# Patient Record
Sex: FEMALE | Employment: OTHER | URBAN - METROPOLITAN AREA
[De-identification: names, ages, dates, MRNs, and addresses within clinical notes are randomized per-mention and may not be internally consistent; named-entity substitution may affect disease eponyms.]

---

## 2018-02-14 PROBLEM — R73.01 IMPAIRED FASTING GLUCOSE: Status: ACTIVE | Noted: 2018-02-14

## 2018-07-10 PROBLEM — E11.9 CONTROLLED TYPE 2 DIABETES MELLITUS WITHOUT COMPLICATION, WITHOUT LONG-TERM CURRENT USE OF INSULIN (HCC): Status: ACTIVE | Noted: 2018-07-10

## 2018-07-20 ENCOUNTER — OFFICE VISIT (OUTPATIENT)
Dept: CARDIOLOGY | Facility: MEDICAL CENTER | Age: 78
End: 2018-07-20
Payer: MEDICARE

## 2018-07-20 VITALS
BODY MASS INDEX: 29.06 KG/M2 | OXYGEN SATURATION: 96 % | HEART RATE: 70 BPM | DIASTOLIC BLOOD PRESSURE: 84 MMHG | SYSTOLIC BLOOD PRESSURE: 138 MMHG | WEIGHT: 164 LBS | RESPIRATION RATE: 14 BRPM | HEIGHT: 63 IN

## 2018-07-20 DIAGNOSIS — I48.0 PAROXYSMAL ATRIAL FIBRILLATION (HCC): ICD-10-CM

## 2018-07-20 DIAGNOSIS — I10 ESSENTIAL HYPERTENSION, BENIGN: ICD-10-CM

## 2018-07-20 DIAGNOSIS — E78.2 MIXED DYSLIPIDEMIA: ICD-10-CM

## 2018-07-20 PROCEDURE — 93000 ELECTROCARDIOGRAM COMPLETE: CPT | Performed by: INTERNAL MEDICINE

## 2018-07-20 PROCEDURE — 99205 OFFICE O/P NEW HI 60 MIN: CPT | Performed by: INTERNAL MEDICINE

## 2018-07-20 NOTE — PROGRESS NOTES
Chief Complaint   Patient presents with   • Atrial Fibrillation       Subjective:   Rosa Bone is a 78 y.o. female who presents today for evaluation of atrial fibrillation.  Considers herself to be very healthy but noticed intermittent heartburn in February which resolved on its own.  She then began to feel exertional breathlessness about a month ago.  This began to bother her with her dance classes which she does 4 times a week as well as when she played pickle ball.  She would have to stop and catch her breath and seem not to have is good his stamina is usual.  Finally she went to see Dr. Chaney who noticed tachycardia and obtain an EKG which revealed atrial fibrillation with moderate to rapid ventricular response.  The patient was placed on diltiazem for rate control and Xarelto for stroke prevention.  She has not had any trouble with the medications but has not noticed much change in her symptoms.  She denies orthopnea, PND, pedal edema.  She did notice slight wheezing a couple of months ago but that subsided.  There is a remote history of smoking but she quit 40 years ago.  She enjoys a glass of wine a day.  There is a history of hypertension in the past but the patient has recently purchased a blood pressure device and has been checking her blood pressure occasionally.  She notices systolic pressures between 150 and 160 with diastolic pressures around 90 often.  She is interested in anticoagulation for stroke prevention but her initial prescription for Xarelto was $400.  The patient has recently developed glucose intolerance and she has some reduction in her renal function with a GFR of 43.  Hyperlipidemia has been a problem in the past as noted below.  However her recent cholesterol is 172, HDL 52,  on no medications just a dietary change.  There is been no stroke or TIA symptoms and no symptoms of claudication.    Past Medical History:   Diagnosis Date   • Allergy    • Anxiety    • Arthritis    •  "Depression    • History of chickenpox    • History of measles    • History of mumps    • Hypertension    • Incontinence of urine    • Insomnia    • Restless legs     With some neuropathic pain   • Shoulder fracture, right, closed, initial encounter Sept 2017 estimated    \"clean break\" no surgery; Dr Gillis     Past Surgical History:   Procedure Laterality Date   • ABDOMINAL HYSTERECTOMY TOTAL  1981    40yo; was on hormone therapy from 4725-6320.  Ovaries are still in.   • FOOT SURGERY       Family History   Problem Relation Age of Onset   • Heart Disease Mother 87   • Arthritis Mother    • Diabetes Mother    • Other Father 96   • Hypertension Brother    • Other Brother      gout   • Heart Failure Paternal Grandfather    • Cancer Other      grandfather   • Stroke Other      grandmother     Social History     Social History   • Marital status: Unknown     Spouse name: N/A   • Number of children: N/A   • Years of education: N/A     Occupational History   • Not on file.     Social History Main Topics   • Smoking status: Former Smoker     Packs/day: 1.00     Years: 15.00     Types: Cigarettes     Quit date: 1/1/1970   • Smokeless tobacco: Never Used   • Alcohol use Yes      Comment: daily wine 1 - 2    • Drug use: No   • Sexual activity: Not Currently     Partners: Male      Comment: ; brother is Brett Franco     Other Topics Concern   • Not on file     Social History Narrative   • No narrative on file     Allergies   Allergen Reactions   • Seasonal      Seasonal Allergies.   • Sulfa Drugs Rash     Rash     Outpatient Encounter Prescriptions as of 7/20/2018   Medication Sig Dispense Refill   • DILTIAZem (CARDIZEM) 30 MG Tab TAKE 1 TABLET BY MOUTH TWICE DAILY 180 Tab 1   • rivaroxaban (XARELTO) 20 MG Tab tablet Take 20 mg by mouth with dinner.     • metFORMIN (GLUCOPHAGE) 500 MG Tab Take 1 Tab by mouth every day. With breakfast 90 Tab 1   • zolpidem (AMBIEN) 5 MG Tab Take 1 Tab by mouth at bedtime as needed for " "Sleep for up to 180 days. 90 Tab 1   • busPIRone (BUSPAR) 5 MG Tab TAKE 1 TABLET BY MOUTH EVERY DAY AND 2 TABLETS BY MOUTH EVERY EVENING (Patient taking differently: TAKE 1 TABLET BY MOUTH EVERY DAY AND 1-2 TABLETS BY MOUTH EVERY EVENING) 180 Tab 0   • gabapentin (NEURONTIN) 400 MG Cap Take 1 Cap by mouth every day. 90 Cap 1   • citalopram (CELEXA) 40 MG Tab Take 1 Tab by mouth every day. 90 Tab 1   • Cholecalciferol (VITAMIN D3) 2000 UNIT Cap Take  by mouth every day.     • ZYRTEC ALLERGY 10 MG Tab Take 10 mg by mouth every morning.     • Multiple Vitamins-Minerals (SENIOR MULTIVITAMIN PLUS PO) Take  by mouth every day.     • diphenhydrAMINE (BENADRYL) 25 MG Tab Take 25 mg by mouth every bedtime.     • apixaban (ELIQUIS) 5mg Tab Take 1 Tab by mouth 2 Times a Day. (Patient not taking: Reported on 7/20/2018) 60 Tab 1   • trazodone (DESYREL) 50 MG Tab Take 1-2 tabs every night at bedtime. (Patient not taking: Reported on 7/20/2018) 90 Tab 0   • acetaminophen-codeine #3 (TYLENOL #3) 300-30 MG Tab Take 1-2 Tabs by mouth every 6 hours as needed for Moderate Pain. (Patient not taking: Reported on 7/20/2018) 10 Tab 0   • hydrochlorothiazide (HYDRODIURIL) 25 MG Tab Take 0.5 Tabs by mouth every day. (Patient not taking: Reported on 7/20/2018) 45 Tab 1   • Turmeric 450 MG Cap Take  by mouth every day.       No facility-administered encounter medications on file as of 7/20/2018.      ROS.  The patient complains of fatigability intermittently excessive sweating when she exercises, and occasional cough, rare wheezing, heartburn as noted in the HPI.  She has long history of depression but is not suicidal.  She has insomnia for which she takes Ambien most nights.  She complains of seasonal allergies and some neck pain which she attributes to arthritis.  All other organ system review responses are negative.     Objective:   Ht 1.6 m (5' 3\")   Wt 74.4 kg (164 lb)   BMI 29.05 kg/m²     Physical Exam   Exam:    Vitals:    07/20/18 " "1009   BP: 138/84   Pulse: 70   Resp: 14   SpO2: 96%   Weight: 74.4 kg (164 lb)   Height: 1.6 m (5' 3\")     Constitutional: Well developed, well nourished female in no acute distress. Appears younger than stated age and provides a good history.   HEENT: Normocephalic, pupils are equal and responsive. bilateral arcus. Conjunctiva and sclera are clear. No xanthelasma. No diagonal ear lobe crease noted. Mucus membranes are moist and pink. Good oral hygiene  Neck: No JVD or HJR. JVP = 4-5cm H2O. Good carotid upstroke with no bruit. No lymphadenopathy, No thyroid enlargement.  Cardiovascular: Regular rhythm, no ectopics. No murmur, gallop, rub or click. No lift, heave,  thrill, or cardiomegaly  Lungs: Normal effort, Clear to auscultation and percussion. No rales, rhonchi, wheezing   Abdomen: Soft, non tender. No liver, kidney, spleen or mass palpable. The abdominal aorta is not palpable. Active bowel sounds are noted and there is no bruit  Extremities:  No cyanosis, edema, clubbing. 1+ posterior tibial and dorsal pedal pulses bilaterally.   Skin:   Warm and dry, no active lesions  Neurologic: Alert & oriented. Strength and sensation grossly intact. No lateralizing signs  Psychiatric:  Affect, mood, and judgement are appropriate      Conversion Encounter on 2/17/2016  [facility]\")' href=\"[email or web address]">2/17/2016   Preventive Medicine, Conversion on 7/17/2015  [facility]\")' href=\"[email or web address]/\">7/29/2015   Conversion Encounter on 3/17/2014  [facility]\")' href=\"[email or web address]/\">3/17/2014     207 (H) 232 (H) View Detailed Result Report  Lipid Panel w Ratios  3/17/2014\")' href=\"[id number] Panel w Ratios/\">219 (H)   227 (H) 168 (H) View Detailed Result Report  Lipid Panel " "w Ratios  3/17/2014\")' href=\"[email or web address] Panel w Ratios/\">141   View Detailed Result Report  Lipid Panel w Ratios  2/17/2016\")' href=\"[email or web address] Panel w Ratios/\">118 View Detailed Result Report  Lipid panel  7/29/2015\")' href=\"[id number] panel/\">139 (H) View Detailed Result Report  Lipid Panel w Ratios  3/17/2014\")' href=\"[id number] Panel w Ratios/\">136 (H)   44 (L) 59 View Detailed Result Report  Lipid Panel w Ratios  3/17/2014\")' href=\"[id number] Panel w Ratios/\">55   View Detailed Result Report  Lipid Panel w Ratios  2/17/2016\")' href=\"[id number] Panel w Ratios/\">2.7   View Detailed Result Report  Lipid Panel w Ratios  3/17/2014\")' href=\"[email or web address] Panel w Ratios/\">2.47   4.7 3.9         EKG reveals normal sinus rhythm with left atrial enlargement which corroborates a dilated left atrium by echocardiography.  She has late transition and poor R-wave progression.    Assessment:     1. Persistent atrial fibrillation (HCC)     2. Mixed dyslipidemia     3. Essential hypertension, benign         Medical Decision Making:  Today's Assessment / Status / Plan:   Many of the symptoms noted in the HPI or probably related to paroxysmal atrial fibrillation.  She is currently in normal sinus rhythm.  Her arrhythmia is likely a result of her left atrial enlargement and hypertension.  She also possibly has diastolic dysfunction.  She has several risk factors for stroke suggesting that chronic anticoagulation is indicated.  She is on Xarelto 20 mg a day which will be continued.  We discussed the financial " advantage of Coumadin anticoagulation versus the new agents.  The patient will contact [facility] to determine if she can get a better price on Xarelto.  She will let us know the results of this interaction.  From a physiologic standpoint, beta-blocker may be more effective for rate control than diltiazem.  I suggested trying metoprolol 25 mg twice daily and discontinuing diltiazem for the time being.  We discussed suppressive therapy but will try to establish how often and how long she is in atrial fibrillation versus sinus rhythm.  The patient will check her blood pressure and pulse with her new device daily.  I suggested that she could return in a month to assess her progress and she has an appointment with Dr. Chaney in about 2 weeks.  There is a history of hyperlipidemia but with dietary changes she has ideal levels at this point.  She will continue with this regimen.  Return in 1 month.

## 2018-07-22 LAB — EKG IMPRESSION: NORMAL

## 2018-07-31 PROBLEM — I48.0 PAROXYSMAL ATRIAL FIBRILLATION (HCC): Status: ACTIVE | Noted: 2018-07-31

## 2018-08-27 ENCOUNTER — OFFICE VISIT (OUTPATIENT)
Dept: CARDIOLOGY | Facility: MEDICAL CENTER | Age: 78
End: 2018-08-27
Payer: MEDICARE

## 2018-08-27 VITALS
SYSTOLIC BLOOD PRESSURE: 152 MMHG | RESPIRATION RATE: 14 BRPM | HEIGHT: 63 IN | HEART RATE: 62 BPM | BODY MASS INDEX: 29.23 KG/M2 | WEIGHT: 165 LBS | DIASTOLIC BLOOD PRESSURE: 88 MMHG | OXYGEN SATURATION: 96 %

## 2018-08-27 DIAGNOSIS — E11.9 CONTROLLED TYPE 2 DIABETES MELLITUS WITHOUT COMPLICATION, WITHOUT LONG-TERM CURRENT USE OF INSULIN (HCC): ICD-10-CM

## 2018-08-27 DIAGNOSIS — I48.0 PAROXYSMAL ATRIAL FIBRILLATION (HCC): ICD-10-CM

## 2018-08-27 PROCEDURE — 99213 OFFICE O/P EST LOW 20 MIN: CPT | Performed by: INTERNAL MEDICINE

## 2018-08-27 RX ORDER — LOSARTAN POTASSIUM 50 MG/1
50 TABLET ORAL DAILY
Qty: 30 TAB | Refills: 3 | Status: SHIPPED | OUTPATIENT
Start: 2018-08-27 | End: 2018-09-08

## 2018-08-27 NOTE — PROGRESS NOTES
"Chief Complaint   Patient presents with   • Follow-Up       Subjective:   Rosa Bone is a 78 y.o. female who presents today in follow-up fibrillation.  The patient is well with no complaints of palpitations.  Her energy level is good and she is not short of breath.  There is no orthopnea, PND, pedal edema.  She has no angina or chest pain or pressure.  She was received quite a discount on the price of Xarelto by using a [employer] company.  Her blood sugars have been on the high side as has her blood pressure.  Home blood pressures over the last few weeks generally range between 150 and 160 systolic and occasionally higher or lower.  She has no symptoms when her blood pressure is elevated.  She does not think her machine is registering irregular beats.    Past Medical History:   Diagnosis Date   • Allergy    • Anxiety    • Arthritis    • Depression    • History of chickenpox    • History of measles    • History of mumps    • Hypertension    • Incontinence of urine    • Insomnia    • Restless legs     With some neuropathic pain   • Shoulder fracture, right, closed, initial encounter Sept 2017 estimated    \"clean break\" no surgery; Dr Gillis     Past Surgical History:   Procedure Laterality Date   • ABDOMINAL HYSTERECTOMY TOTAL  1981    40yo; was on hormone therapy from 0754-2353.  Ovaries are still in.   • FOOT SURGERY       Family History   Problem Relation Age of Onset   • Heart Disease Mother 87   • Arthritis Mother    • Diabetes Mother    • Other Father 96   • Hypertension Brother    • Other Brother         gout   • Heart Failure Paternal Grandfather    • Cancer Other         grandfather   • Stroke Other         grandmother     Social History     Social History   • Marital status: Unknown     Spouse name: N/A   • Number of children: N/A   • Years of education: N/A     Occupational History   • Not on file.     Social History Main Topics   • Smoking status: Former Smoker     Packs/day: 1.00     Years: 15.00 "     Types: Cigarettes     Quit date: 1/1/1970   • Smokeless tobacco: Never Used   • Alcohol use Yes      Comment: daily wine 1 - 2    • Drug use: No   • Sexual activity: Not Currently     Partners: Male      Comment: ; brother is Brett Franco     Other Topics Concern   • Not on file     Social History Narrative   • No narrative on file     Allergies   Allergen Reactions   • Seasonal      Seasonal Allergies.   • Sulfa Drugs Rash     Rash     Outpatient Encounter Prescriptions as of 8/27/2018   Medication Sig Dispense Refill   • losartan (COZAAR) 50 MG Tab Take 1 Tab by mouth every day. 30 Tab 3   • citalopram (CELEXA) 40 MG Tab TAKE 1 TABLET BY MOUTH  EVERY DAY 90 Tab 1   • metFORMIN (GLUCOPHAGE) 500 MG Tab Take 1 Tab by mouth 2 times a day, with meals. With breakfast 180 Tab 1   • rivaroxaban (XARELTO) 20 MG Tab tablet Take 1 Tab by mouth with dinner. 90 Tab 1   • metoprolol (LOPRESSOR) 25 MG Tab TAKE 1 TABLET BY MOUTH TWICE DAILY 180 Tab 0   • zolpidem (AMBIEN) 5 MG Tab Take 1 Tab by mouth at bedtime as needed for Sleep for up to 180 days. 90 Tab 1   • busPIRone (BUSPAR) 5 MG Tab TAKE 1 TABLET BY MOUTH EVERY DAY AND 2 TABLETS BY MOUTH EVERY EVENING (Patient taking differently: TAKE ONE TABLET BY MOUTH TWICE A DAY.) 180 Tab 0   • gabapentin (NEURONTIN) 400 MG Cap Take 1 Cap by mouth every day. 90 Cap 1   • Cholecalciferol (VITAMIN D3) 2000 UNIT Cap Take  by mouth every day.     • ZYRTEC ALLERGY 10 MG Tab Take 10 mg by mouth every morning.     • Multiple Vitamins-Minerals (SENIOR MULTIVITAMIN PLUS PO) Take  by mouth every day.     • diphenhydrAMINE (BENADRYL) 25 MG Tab Take 25 mg by mouth every bedtime.     • DILTIAZem (CARDIZEM) 30 MG Tab TAKE 1 TABLET BY MOUTH TWICE DAILY (Patient not taking: Reported on 8/27/2018) 180 Tab 1   • apixaban (ELIQUIS) 5mg Tab Take 1 Tab by mouth 2 Times a Day. (Patient not taking: Reported on 7/20/2018) 60 Tab 1   • trazodone (DESYREL) 50 MG Tab Take 1-2 tabs every night at  "bedtime. (Patient not taking: Reported on 7/20/2018) 90 Tab 0   • acetaminophen-codeine #3 (TYLENOL #3) 300-30 MG Tab Take 1-2 Tabs by mouth every 6 hours as needed for Moderate Pain. (Patient not taking: Reported on 7/20/2018) 10 Tab 0   • hydrochlorothiazide (HYDRODIURIL) 25 MG Tab Take 0.5 Tabs by mouth every day. (Patient not taking: Reported on 7/20/2018) 45 Tab 1   • Turmeric 450 MG Cap Take  by mouth every day.       No facility-administered encounter medications on file as of 8/27/2018.      ROS.  See HPI     Objective:   /88   Pulse 62   Resp 14   Ht 1.6 m (5' 3\")   Wt 74.8 kg (165 lb)   SpO2 96%   BMI 29.23 kg/m²     Physical Exam General: WD, WN, female in NAD. Weight  Neck: No JVD.  Good carotid upstroke and no bruit  Chest: Clear to A & P  Heart: Regular rhythm, Normal S1 and S2. No murmur, gallop, rub, click  Ext: No edema  Neuro: Alert, oriented  Psych: normal mood, affect    Assessment:     1. Paroxysmal atrial fibrillation (HCC)     2. Controlled type 2 diabetes mellitus without complication, without long-term current use of insulin (HCC)         Medical Decision Making:  Today's Assessment / Status / Plan:   Currently the patient is in normal sinus rhythm.  Her blood pressure is a little elevated which is similar to the pressures that she has recorded at home.  I suggested starting losartan 50 mg a day because it is generally well tolerated and does offer some renal protection.  She will see Dr. Chaney within 2-3 weeks for blood pressure follow-up.  She will return here in 3-4 months.  She will remain on Xarelto for stroke prevention because she cannot be certain that she is in normal sinus rhythm all the  time.  However, metoprolol does not have suppressive effect and is mildly effective for HTN so I suggested she stop this drug and we will use losartan to control her blood pressure.  If atrial fibrillation recurs and she feels short of breath or tachycardia, metoprolol could be " restarted to control the rate and perhaps help her symptomatically.  She will call if there are any questions regarding her blood pressure  before she sees Dr. Chaney.

## 2018-09-20 PROBLEM — I10 BENIGN ESSENTIAL HTN: Status: ACTIVE | Noted: 2018-09-20

## 2018-12-10 ENCOUNTER — OFFICE VISIT (OUTPATIENT)
Dept: CARDIOLOGY | Facility: MEDICAL CENTER | Age: 78
End: 2018-12-10
Payer: MEDICARE

## 2018-12-10 VITALS
DIASTOLIC BLOOD PRESSURE: 80 MMHG | RESPIRATION RATE: 14 BRPM | HEART RATE: 78 BPM | HEIGHT: 63 IN | OXYGEN SATURATION: 98 % | BODY MASS INDEX: 30.12 KG/M2 | WEIGHT: 170 LBS | SYSTOLIC BLOOD PRESSURE: 142 MMHG

## 2018-12-10 DIAGNOSIS — I48.0 PAROXYSMAL ATRIAL FIBRILLATION (HCC): ICD-10-CM

## 2018-12-10 DIAGNOSIS — I10 BENIGN ESSENTIAL HTN: ICD-10-CM

## 2018-12-10 DIAGNOSIS — E78.2 MIXED DYSLIPIDEMIA: ICD-10-CM

## 2018-12-10 PROCEDURE — 99213 OFFICE O/P EST LOW 20 MIN: CPT | Performed by: INTERNAL MEDICINE

## 2018-12-10 RX ORDER — LANCETS 33 GAUGE
EACH MISCELLANEOUS
Refills: 0 | COMMUNITY
Start: 2018-09-20 | End: 2019-01-30 | Stop reason: SDUPTHER

## 2018-12-10 NOTE — PROGRESS NOTES
"Chief Complaint   Patient presents with   • Atrial Fibrillation     paroxysmal AFIB.       Subjective:   Rosa Bone is a 78 y.o. female who presents today in follow-up for hypertension.  The patient is checked her blood pressure every morning upon awakening and has noticed blood pressures from 150-160 often but occasionally in the 140s systolic.  He has no symptoms related to her blood pressure.  Does not feel palpitations and has not noticed high heart rates.  Her energy level is good.  She does use salt on her food.    Past Medical History:   Diagnosis Date   • Allergy    • Anxiety    • Arthritis    • Depression    • History of chickenpox    • History of measles    • History of mumps    • Hypertension    • Incontinence of urine    • Insomnia    • Restless legs     With some neuropathic pain   • Shoulder fracture, right, closed, initial encounter Sept 2017 estimated    \"clean break\" no surgery; Dr Gillis     Past Surgical History:   Procedure Laterality Date   • ABDOMINAL HYSTERECTOMY TOTAL  1981    42yo; was on hormone therapy from 7137-6642.  Ovaries are still in.   • FOOT SURGERY       Family History   Problem Relation Age of Onset   • Heart Disease Mother 87   • Arthritis Mother    • Diabetes Mother    • Other Father 96   • Hypertension Brother    • Other Brother         gout   • Heart Failure Paternal Grandfather    • Cancer Other         grandfather   • Stroke Other         grandmother     Social History     Social History   • Marital status: Unknown     Spouse name: N/A   • Number of children: N/A   • Years of education: N/A     Occupational History   • Not on file.     Social History Main Topics   • Smoking status: Former Smoker     Packs/day: 1.00     Years: 15.00     Types: Cigarettes     Quit date: 1/1/1970   • Smokeless tobacco: Never Used   • Alcohol use No   • Drug use: No   • Sexual activity: Not Currently     Partners: Male      Comment: ; brother is Brett Franco     Other Topics " "Concern   • Not on file     Social History Narrative   • No narrative on file     Allergies   Allergen Reactions   • Seasonal      Seasonal Allergies.   • Sulfa Drugs Rash     Rash     Outpatient Encounter Prescriptions as of 12/10/2018   Medication Sig Dispense Refill   • ONE TOUCH ULTRA TEST strip USE TO TEST BLOOD QD  0   • ONETOUCH DELICA LANCETS 33G Misc USE TO TEST BLOOD QD  0   • metFORMIN (GLUCOPHAGE) 500 MG Tab TAKE 1 TABLET BY MOUTH  TWICE A DAY WITH MEALS 180 Tab 1   • busPIRone (BUSPAR) 5 MG tablet TAKE 1 TABLET BY MOUTH EVERY DAY AND 2 TABLETS BY MOUTH EVERY EVENING (Patient taking differently: TAKE ONE TABLET BY MOUTH IN THE EVENING.) 180 Tab 0   • amLODIPine (NORVASC) 2.5 MG Tab Take 1 Tab by mouth every day. At dinner 30 Tab 1   • XARELTO 20 MG Tab tablet TAKE 1 TABLET BY MOUTH WITH DINNER 90 Tab 1   • gabapentin (NEURONTIN) 400 MG Cap Take 1 Cap by mouth every day. 90 Cap 3   • citalopram (CELEXA) 40 MG Tab Take 1 Tab by mouth every day. In early day 90 Tab 1   • zolpidem (AMBIEN) 5 MG Tab Take 1 Tab by mouth at bedtime as needed for Sleep for up to 180 days. 90 Tab 1   • Cholecalciferol (VITAMIN D3) 2000 UNIT Cap Take  by mouth every day.     • ZYRTEC ALLERGY 10 MG Tab Take 10 mg by mouth every morning.     • Multiple Vitamins-Minerals (SENIOR MULTIVITAMIN PLUS PO) Take  by mouth every day.     • diphenhydrAMINE (BENADRYL) 25 MG Tab Take 25 mg by mouth every bedtime.     • Turmeric 450 MG Cap Take  by mouth every day.       No facility-administered encounter medications on file as of 12/10/2018.      ROS.  See HPI     Objective:   /80 (BP Location: Left arm, Patient Position: Sitting, BP Cuff Size: Adult)   Pulse 78   Resp 14   Ht 1.6 m (5' 3\")   Wt 77.1 kg (170 lb)   SpO2 98%   BMI 30.11 kg/m²     Physical Exam General: WD, WN, female in NAD.  Repeat blood pressure 156/86  Neck: No  JVD. Good carotid upstroke and no bruit  Chest: Clear to A & P  Heart: Regular rhythm, Normal S1 and S2. " No murmur, gallop, rub, click  Ext: No edema  Neuro: Alert, oriented  Psych: normal mood, affect    Assessment:     1. Paroxysmal atrial fibrillation (HCC)     2. Benign essential HTN     3. Mixed dyslipidemia         Medical Decision Making:  Today's Assessment / Status / Plan:   The patient's rhythm is normal sinus today by auscultation.  Her blood pressure is elevated and has been similar as.  She is taking a very low dose of amlodipine therefore I suggested increasing the amlodipine to 5 mg a day from her current dose of 2.5 mg a day.  After 3 or 4 days if her blood pressure remains in the 140-150 range, she can increase to 10 mg a day.  If her blood pressure remains difficult to control adding a mild diuretic be a reasonable choice but on an infrequent basis.  The patient will call if there are any questions about doses and blood pressure.  She will also pay attention to salt intake and keep it to a minimum.  Return in 2 months if all goes well.

## 2018-12-26 RX ORDER — AMLODIPINE BESYLATE 10 MG/1
10 TABLET ORAL DAILY
Qty: 90 TAB | Refills: 0 | Status: SHIPPED | OUTPATIENT
Start: 2018-12-26 | End: 2019-02-13 | Stop reason: SDUPTHER

## 2019-02-13 DIAGNOSIS — I10 ESSENTIAL HYPERTENSION: Primary | ICD-10-CM

## 2019-02-13 RX ORDER — AMLODIPINE BESYLATE 10 MG/1
TABLET ORAL
Qty: 90 TAB | Refills: 3 | Status: SHIPPED | OUTPATIENT
Start: 2019-02-13 | End: 2020-03-15 | Stop reason: SDUPTHER

## 2019-03-01 ENCOUNTER — OFFICE VISIT (OUTPATIENT)
Dept: CARDIOLOGY | Facility: MEDICAL CENTER | Age: 79
End: 2019-03-01
Payer: MEDICARE

## 2019-03-01 VITALS
BODY MASS INDEX: 29.59 KG/M2 | HEART RATE: 78 BPM | HEIGHT: 63 IN | WEIGHT: 167 LBS | SYSTOLIC BLOOD PRESSURE: 124 MMHG | DIASTOLIC BLOOD PRESSURE: 72 MMHG | OXYGEN SATURATION: 96 % | RESPIRATION RATE: 12 BRPM

## 2019-03-01 DIAGNOSIS — I48.0 PAROXYSMAL ATRIAL FIBRILLATION (HCC): ICD-10-CM

## 2019-03-01 DIAGNOSIS — I10 BENIGN ESSENTIAL HTN: ICD-10-CM

## 2019-03-01 PROCEDURE — 99213 OFFICE O/P EST LOW 20 MIN: CPT | Performed by: INTERNAL MEDICINE

## 2019-03-01 NOTE — PROGRESS NOTES
"Chief Complaint   Patient presents with   • Atrial Fibrillation       Subjective:   Rosa Bone is a 79 y.o. female who presents today in follow-up for hypertension.  The patient has paroxysmal atrial fibrillation and is not felt any palpitations of late.  She has had no trouble with the higher dose of amlodipine.  She went to 10 mg a day because her blood pressure was not adequately controlled on 5.  He feels well and has no symptoms related to her blood pressure or the medication.    Past Medical History:   Diagnosis Date   • Allergy    • Anxiety    • Arthritis    • Depression    • Health care maintenance 01/05/2018    cologuard negative; due in Jan 2021   • History of chickenpox    • History of measles    • History of mumps    • Hypertension    • Incontinence of urine    • Insomnia    • Restless legs     With some neuropathic pain   • Shoulder fracture, right, closed, initial encounter Sept 2017 estimated    \"clean break\" no surgery; Dr Gillis     Past Surgical History:   Procedure Laterality Date   • ABDOMINAL HYSTERECTOMY TOTAL  1981    40yo; was on hormone therapy from 8799-7900.  Ovaries are still in.   • FOOT SURGERY       Family History   Problem Relation Age of Onset   • Heart Disease Mother 87   • Arthritis Mother    • Diabetes Mother    • Other Father 96   • Hypertension Brother    • Other Brother         gout   • Heart Failure Paternal Grandfather    • Cancer Other         grandfather   • Stroke Other         grandmother     Social History     Social History   • Marital status: Unknown     Spouse name: N/A   • Number of children: N/A   • Years of education: N/A     Occupational History   • Not on file.     Social History Main Topics   • Smoking status: Former Smoker     Packs/day: 1.00     Years: 15.00     Types: Cigarettes     Quit date: 1/1/1970   • Smokeless tobacco: Never Used   • Alcohol use No   • Drug use: No   • Sexual activity: Not Currently     Partners: Male      Comment: ; " "brother is Brett Franco     Other Topics Concern   • Not on file     Social History Narrative   • No narrative on file     Allergies   Allergen Reactions   • Seasonal      Seasonal Allergies.   • Sulfa Drugs Rash     Rash     Outpatient Encounter Prescriptions as of 3/1/2019   Medication Sig Dispense Refill   • amLODIPine (NORVASC) 10 MG Tab TAKE 1 TABLET BY MOUTH  EVERY DAY 90 Tab 3   • ONE TOUCH ULTRA TEST strip USE TO TEST BLOOD QD. Dx; E11.9 100 Strip 3   • ONETOUCH DELICA LANCETS 33G Misc 1 Each by Other route every day. Use to test fasting blood sugar once daily. Dx: E11.9 100 Each 3   • losartan (COZAAR) 100 MG Tab Take 1 Tab by mouth every day. In am 90 Tab 3   • busPIRone (BUSPAR) 5 MG tablet 1 pill by mouth every evening 90 Tab 1   • zolpidem (AMBIEN) 5 MG Tab Take 1 Tab by mouth at bedtime as needed for Sleep for up to 180 days. 90 Tab 1   • metFORMIN (GLUCOPHAGE) 500 MG Tab TAKE 1 TABLET BY MOUTH  TWICE A DAY WITH MEALS 180 Tab 1   • XARELTO 20 MG Tab tablet TAKE 1 TABLET BY MOUTH WITH DINNER 90 Tab 1   • gabapentin (NEURONTIN) 400 MG Cap Take 1 Cap by mouth every day. 90 Cap 3   • citalopram (CELEXA) 40 MG Tab Take 1 Tab by mouth every day. In early day 90 Tab 1   • Cholecalciferol (VITAMIN D3) 2000 UNIT Cap Take  by mouth every day.     • ZYRTEC ALLERGY 10 MG Tab Take 10 mg by mouth every morning.     • Multiple Vitamins-Minerals (SENIOR MULTIVITAMIN PLUS PO) Take  by mouth every day.     • Turmeric 450 MG Cap Take  by mouth every day.     • diphenhydrAMINE (BENADRYL) 25 MG Tab Take 25 mg by mouth every bedtime.       No facility-administered encounter medications on file as of 3/1/2019.      ROS.  See HPI     Objective:   /72 (BP Location: Right arm, Patient Position: Sitting, BP Cuff Size: Adult)   Pulse 78   Resp 12   Ht 1.6 m (5' 3\")   Wt 75.8 kg (167 lb)   SpO2 96%   BMI 29.58 kg/m²     Physical Exam General: WD, WN, female in NAD.   Neck: No  JVD. Good carotid upstroke and no " bruit  Chest: Clear to A & P  Heart: Regular rhythm, Normal S1 and S2. No murmur, gallop, rub, click  Ext: No edema  Neuro: Alert, oriented  Psych: normal mood, affect    Assessment:     1. Paroxysmal atrial fibrillation (HCC)     2. Benign essential HTN         Medical Decision Making:  Today's Assessment / Status / Plan:   The patient is currently in atrial fibrillation.  She takes Xarelto for stroke prevention and is tolerating the medication well.  Her blood pressure is well controlled on the higher dose of amlodipine which she is tolerating.  No medical changes will be made at this time and the patient will return in 6 months and will follow up with Dr. Chaney a regular basis

## 2020-03-15 DIAGNOSIS — I10 ESSENTIAL HYPERTENSION: ICD-10-CM

## 2020-03-16 RX ORDER — AMLODIPINE BESYLATE 10 MG/1
10 TABLET ORAL
Qty: 90 TAB | Refills: 3 | Status: SHIPPED | OUTPATIENT
Start: 2020-03-16 | End: 2020-12-27

## 2021-01-26 ENCOUNTER — TELEPHONE (OUTPATIENT)
Dept: CARDIOLOGY | Facility: MEDICAL CENTER | Age: 81
End: 2021-01-26

## 2021-01-27 NOTE — TELEPHONE ENCOUNTER
The patient called this evening after attempting to call her cardiologist Dr Mtz.  She was recently diagnosed with COVID-19 9 days ago continues to be fevers. She checked pulse oximeter found her O2 to be 91% but heart rate 160 bpm and continue to be elevated in the same range on rechecking.  Very concerned because of her elevated heart rate.  Asymptomatic palpitations but does feel fatigue.  No shortness of breath, chest pain or lightheadedness.  Has decided to go to ER for evaluation and instructed her to contact Dr. Armstrong in the a.m.